# Patient Record
Sex: FEMALE | Race: WHITE | NOT HISPANIC OR LATINO | Employment: FULL TIME | URBAN - METROPOLITAN AREA
[De-identification: names, ages, dates, MRNs, and addresses within clinical notes are randomized per-mention and may not be internally consistent; named-entity substitution may affect disease eponyms.]

---

## 2017-01-30 ENCOUNTER — APPOINTMENT (OUTPATIENT)
Dept: SLEEP MEDICINE | Facility: MEDICAL CENTER | Age: 37
End: 2017-01-30
Payer: COMMERCIAL

## 2019-11-08 ENCOUNTER — HOSPITAL ENCOUNTER (OUTPATIENT)
Dept: RADIOLOGY | Facility: MEDICAL CENTER | Age: 39
End: 2019-11-08
Attending: OBSTETRICS & GYNECOLOGY
Payer: COMMERCIAL

## 2019-11-08 DIAGNOSIS — N64.4 PAIN OF BREAST: ICD-10-CM

## 2019-11-08 PROCEDURE — 76642 ULTRASOUND BREAST LIMITED: CPT | Mod: RT

## 2019-11-08 PROCEDURE — G0279 TOMOSYNTHESIS, MAMMO: HCPCS

## 2021-01-05 ENCOUNTER — TELEMEDICINE (OUTPATIENT)
Dept: TELEHEALTH | Facility: TELEMEDICINE | Age: 41
End: 2021-01-05
Payer: COMMERCIAL

## 2021-01-05 VITALS — BODY MASS INDEX: 30.12 KG/M2 | WEIGHT: 170 LBS | TEMPERATURE: 100.2 F | HEIGHT: 63 IN

## 2021-01-05 DIAGNOSIS — J02.9 SORE THROAT: ICD-10-CM

## 2021-01-05 DIAGNOSIS — J06.9 UPPER RESPIRATORY TRACT INFECTION, UNSPECIFIED TYPE: ICD-10-CM

## 2021-01-05 PROCEDURE — 99203 OFFICE O/P NEW LOW 30 MIN: CPT | Mod: 95,CR,CS | Performed by: PHYSICIAN ASSISTANT

## 2021-01-06 NOTE — PROGRESS NOTES
Virtual Visit: New Patient   This visit was conducted via Zoom using secure and encrypted videoconferencing technology. The patient was in a private location in the state of [place].  The patient's identity was confirmed and verbal consent was obtained for this virtual visit.    Subjective:     CC:   Chief Complaint   Patient presents with   • Pharyngitis       Vicky Mijarse is a 40 y.o. female presenting to establish care and to discuss the evaluation and management of:    Sore throat-possible strep.  Patient reports that she developed sore throat, body aches with fevers upwards of 100.2.  She also reports associated coughing, sneezing, runny nose.  She did start herself on Amoxil 500 mg 3 times daily yesterday for suspected strep pharyngitis of which in general she does report improvement.  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  She denies any known exposure that she is aware of.  She does report that her kids have been recently ill however feel better at this time.  She specifically denies any shortness of breath, difficulty breathing.    ROS  See HPI  Constitutional: N as above  HENT: As above  Eyes: Negative for pain.   Respiratory: As above  Cardiovascular: Negative for leg swelling.   Gastrointestinal: Negative for nausea, vomiting, abdominal pain and diarrhea.   Genitourinary: Negative for dysuria and hematuria.   Skin: Negative for rash.   Neurological: Negative for dizziness, focal weakness and headaches.   Endo/Heme/Allergies: Does not bruise/bleed easily.   Psychiatric/Behavioral:   The patient is not nervous/anxious.      Not on File    Current medicines (including changes today)  No current outpatient medications on file.     No current facility-administered medications for this visit.        She  has no past medical history on file.  She  has no past surgical history on file.      No family history on file.  No family  "status information on file.       There are no active problems to display for this patient.         Objective:   Temp 37.9 °C (100.2 °F)   Ht 1.6 m (5' 3\")   Wt 77.1 kg (170 lb)   BMI 30.11 kg/m²     Physical Exam:  Constitutional: Alert, no distress, well-groomed.  Skin: No rashes in visible areas.  Eye: Round. Conjunctiva clear, lids normal. No icterus.   ENMT: Lips pink without lesions, good dentition, moist mucous membranes. Phonation normal.  Neck: No masses, no thyromegaly. Moves freely without pain.  Respiratory: Unlabored respiratory effort, no cough or audible wheeze  Psych: Alert and oriented x3, normal affect and mood.       Assessment and Plan:   The following treatment plan was discussed:     1. Upper respiratory tract infection, unspecified type  - COVID/SARS COV-2 PCR; Future    2. Sore throat    Patient currently treating herself for suspected strep at this time.  Low Centor criteria however she does report improvement of symptoms after starting such.  Patient will continue on Amoxil at this time-however in the interim we will send off for COVID-19 testing.  Noted importance of self-isolation until results return of which I will follow up with this patient via [facility] as such returns.  Furthermore discussed over-the-counter therapies for symptoms.  No indication of secondary bacterial infection or respiratory distress needing higher level of care.    Follow-up: As needed or worsening symptoms         Please note that this dictation was created using voice recognition software. I have made every reasonable attempt to correct obvious errors, but I expect that there are errors of grammar and possibly content that I did not discover before finalizing the note.    "

## 2023-07-10 ENCOUNTER — APPOINTMENT (OUTPATIENT)
Dept: RADIOLOGY | Facility: MEDICAL CENTER | Age: 43
End: 2023-07-10
Payer: COMMERCIAL

## 2023-07-10 DIAGNOSIS — Z12.31 VISIT FOR SCREENING MAMMOGRAM: ICD-10-CM

## 2024-05-03 ENCOUNTER — APPOINTMENT (OUTPATIENT)
Dept: RADIOLOGY | Facility: MEDICAL CENTER | Age: 44
End: 2024-05-03
Attending: NURSE PRACTITIONER
Payer: COMMERCIAL

## 2024-05-03 DIAGNOSIS — Z12.31 ENCOUNTER FOR MAMMOGRAM TO ESTABLISH BASELINE MAMMOGRAM: ICD-10-CM
